# Patient Record
Sex: FEMALE | Race: WHITE
[De-identification: names, ages, dates, MRNs, and addresses within clinical notes are randomized per-mention and may not be internally consistent; named-entity substitution may affect disease eponyms.]

---

## 2020-02-04 ENCOUNTER — HOSPITAL ENCOUNTER (EMERGENCY)
Dept: HOSPITAL 62 - ER | Age: 60
LOS: 1 days | Discharge: HOME | End: 2020-02-05
Payer: COMMERCIAL

## 2020-02-04 VITALS — DIASTOLIC BLOOD PRESSURE: 71 MMHG | SYSTOLIC BLOOD PRESSURE: 150 MMHG

## 2020-02-04 DIAGNOSIS — R11.2: ICD-10-CM

## 2020-02-04 DIAGNOSIS — E11.9: ICD-10-CM

## 2020-02-04 DIAGNOSIS — R07.9: ICD-10-CM

## 2020-02-04 DIAGNOSIS — Z88.2: ICD-10-CM

## 2020-02-04 DIAGNOSIS — R00.2: ICD-10-CM

## 2020-02-04 DIAGNOSIS — K21.0: Primary | ICD-10-CM

## 2020-02-04 DIAGNOSIS — R06.02: ICD-10-CM

## 2020-02-04 DIAGNOSIS — Z88.0: ICD-10-CM

## 2020-02-04 DIAGNOSIS — F41.9: ICD-10-CM

## 2020-02-04 PROCEDURE — 84443 ASSAY THYROID STIM HORMONE: CPT

## 2020-02-04 PROCEDURE — 83735 ASSAY OF MAGNESIUM: CPT

## 2020-02-04 PROCEDURE — 80053 COMPREHEN METABOLIC PANEL: CPT

## 2020-02-04 PROCEDURE — 93005 ELECTROCARDIOGRAM TRACING: CPT

## 2020-02-04 PROCEDURE — 71046 X-RAY EXAM CHEST 2 VIEWS: CPT

## 2020-02-04 PROCEDURE — 83690 ASSAY OF LIPASE: CPT

## 2020-02-04 PROCEDURE — 84484 ASSAY OF TROPONIN QUANT: CPT

## 2020-02-04 PROCEDURE — 99285 EMERGENCY DEPT VISIT HI MDM: CPT

## 2020-02-04 PROCEDURE — 85025 COMPLETE CBC W/AUTO DIFF WBC: CPT

## 2020-02-04 PROCEDURE — 93010 ELECTROCARDIOGRAM REPORT: CPT

## 2020-02-04 PROCEDURE — 36415 COLL VENOUS BLD VENIPUNCTURE: CPT

## 2020-02-04 NOTE — ER DOCUMENT REPORT
ED Medical Screen (RME)





- General


Chief Complaint: General Weakness


Stated Complaint: SHORTNESS OF BREATH


Time Seen by Provider: 02/04/20 23:55


Mode of Arrival: Wheelchair


Information source: Patient


Notes: 





Patient states that around 930 this evening she had a fast heart rate became 

short of breath and felt faint.  Patient does states she is had cold symptoms 

for the past 5 days.  Patient denies any significant cough.  Patient complains 

of a burning sensation in her chest but attributes this to acid reflux.  Patient

does complain of nausea.  Patient does complain of feeling lightheaded still.





I have greeted and performed a rapid initial assessment of this patient.  A 

comprehensive ED assessment and evaluation of the patient, analysis of test 

results and completion of the medical decision making process will be conducted 

by additional ED providers.


TRAVEL OUTSIDE OF THE [place] IN LAST 30 DAYS: No





- Related Data


Allergies/Adverse Reactions: 


                                        





Penicillins Allergy (Verified 02/04/20 23:53)


   


Sulfa (Sulfonamide Antibiotics) Allergy (Verified 02/04/20 23:53)


   











Physical Exam





- Vital signs


Vitals: 





                                        











Temp Pulse Resp BP Pulse Ox


 


 98.2 F   93   18   150/71 H  99 


 


 02/04/20 23:26  02/04/20 23:26  02/04/20 23:26  02/04/20 23:26  02/04/20 23:26














- Respiratory


Respiratory status: No respiratory distress


Chest status: Tender


Breath sounds: Normal





Course





- Vital Signs


Vital signs: 





                                        











Temp Pulse Resp BP Pulse Ox


 


 98.2 F   93   18   150/71 H  99 


 


 02/04/20 23:26  02/04/20 23:26  02/04/20 23:26  02/04/20 23:26  02/04/20 23:26

## 2020-02-05 LAB
ADD MANUAL DIFF: NO
ALBUMIN SERPL-MCNC: 4.3 G/DL (ref 3.5–5)
ALP SERPL-CCNC: 102 U/L (ref 38–126)
ANION GAP SERPL CALC-SCNC: 9 MMOL/L (ref 5–19)
AST SERPL-CCNC: 29 U/L (ref 14–36)
BASOPHILS # BLD AUTO: 0 10^3/UL (ref 0–0.2)
BASOPHILS NFR BLD AUTO: 0.6 % (ref 0–2)
BILIRUB DIRECT SERPL-MCNC: 0 MG/DL (ref 0–0.4)
BILIRUB SERPL-MCNC: 0.2 MG/DL (ref 0.2–1.3)
BUN SERPL-MCNC: 16 MG/DL (ref 7–20)
CALCIUM: 9.3 MG/DL (ref 8.4–10.2)
CHLORIDE SERPL-SCNC: 106 MMOL/L (ref 98–107)
CO2 SERPL-SCNC: 27 MMOL/L (ref 22–30)
EOSINOPHIL # BLD AUTO: 0 10^3/UL (ref 0–0.6)
EOSINOPHIL NFR BLD AUTO: 0.6 % (ref 0–6)
ERYTHROCYTE [DISTWIDTH] IN BLOOD BY AUTOMATED COUNT: 13.8 % (ref 11.5–14)
GLUCOSE SERPL-MCNC: 128 MG/DL (ref 75–110)
HCT VFR BLD CALC: 41.2 % (ref 36–47)
HGB BLD-MCNC: 13.7 G/DL (ref 12–15.5)
LYMPHOCYTES # BLD AUTO: 1.9 10^3/UL (ref 0.5–4.7)
LYMPHOCYTES NFR BLD AUTO: 22.1 % (ref 13–45)
MCH RBC QN AUTO: 29.9 PG (ref 27–33.4)
MCHC RBC AUTO-ENTMCNC: 33.2 G/DL (ref 32–36)
MCV RBC AUTO: 90 FL (ref 80–97)
MONOCYTES # BLD AUTO: 0.5 10^3/UL (ref 0.1–1.4)
MONOCYTES NFR BLD AUTO: 5.7 % (ref 3–13)
NEUTROPHILS # BLD AUTO: 6.1 10^3/UL (ref 1.7–8.2)
NEUTS SEG NFR BLD AUTO: 71 % (ref 42–78)
PLATELET # BLD: 305 10^3/UL (ref 150–450)
POTASSIUM SERPL-SCNC: 4.5 MMOL/L (ref 3.6–5)
PROT SERPL-MCNC: 7.5 G/DL (ref 6.3–8.2)
RBC # BLD AUTO: 4.57 10^6/UL (ref 3.72–5.28)
TOTAL CELLS COUNTED % (AUTO): 100 %
WBC # BLD AUTO: 8.5 10^3/UL (ref 4–10.5)

## 2020-02-05 NOTE — ER DOCUMENT REPORT
ED General





- General


Chief Complaint: Nausea/Vomiting


Stated Complaint: SHORTNESS OF BREATH


Time Seen by Provider: 02/04/20 23:55


Mode of Arrival: Wheelchair


Information source: Patient


TRAVEL OUTSIDE OF THE [place] IN LAST 30 DAYS: No





- HPI


Onset: Other - last night around 11pm


Onset/Duration: Sudden


Quality of pain: Burning, Sharp


Severity: Moderate


Pain Level: 2


Associated symptoms: Shortness of breath, Other - Anxiety, GERD


Exacerbated by: Denies


Relieved by: Denies


Similar symptoms previously: Yes - with Anxiety


Recently seen / treated by doctor: Yes - Patient recently had an upper and lower

endoscopy and was told she has GERD


Notes: 





59 year old female with a history of GERD (she just had an upper and lower 

endoscopy and she is on a PPI), Diabetes, and Anxiety here for chest pain, 

palpitations, and shortness of breath that started around 11PM last night and 

lasted a couple hours. The patient says she has been under a lot of stress and 

she felt like this was just a panic attack. The patient tells me she saw a 

Cardiologist about a year ago since she was having chest pains and she had a [facility] cadia stress test then. The patient was referred to a GI Doctor by the 

Cardiologist and she recently was diagnosed with GERD.





- Related Data


Allergies/Adverse Reactions: 


                                        





Penicillins Allergy (Verified 02/04/20 23:53)


   


Sulfa (Sulfonamide Antibiotics) Allergy (Verified 02/04/20 23:53)


   











Past Medical History





- General


Information source: Patient





- Social History


Smoking Status: Never Smoker


Chew tobacco use (# tins/day): No


Frequency of alcohol use: Social


Drug Abuse: None


Lives with: Family


Family History: CAD - in parents in their mid 50s


Patient has suicidal ideation: No


Patient has homicidal ideation: No





- Past Medical History


Cardiac Medical History: Reports: None


Pulmonary Medical History: Reports: None


EENT Medical History: Reports: None


Neurological Medical History: Reports: None


Endocrine Medical History: Reports: Hx Diabetes Mellitus Type 2


Renal/ Medical History: Reports: None


Malignancy Medical History: Reports: None


GI Medical History: Reports: Hx Gastroesophageal Reflux Disease


Musculoskeletal Medical History: Reports None


Skin Medical History: Reports None


Psychiatric Medical History: Reports: Hx Anxiety





Review of Systems





- Review of Systems


Constitutional: No symptoms reported


EENT: No symptoms reported


Cardiovascular: Chest pain, Palpitations


Respiratory: Short of breath


Gastrointestinal: Other - GERD Symptoms


Genitourinary: No symptoms reported


Female Genitourinary: No symptoms reported


Musculoskeletal: No symptoms reported


Skin: No symptoms reported


Hematologic/Lymphatic: No symptoms reported


Neurological/Psychological: Anxiety


-: Yes All other systems reviewed and negative





Physical Exam





- Vital signs


Vitals: 


                                        











Temp Pulse Resp BP Pulse Ox


 


 98.2 F   93   18   150/71 H  99 


 


 02/04/20 23:26  02/04/20 23:26  02/04/20 23:26  02/04/20 23:26  02/04/20 23:26














- Notes


Notes: 





GENERAL: Well-appearing, well-nourished and in no acute distress.


HEAD: Atraumatic, normocephalic.


EYES: Pupils equal round and reactive to light, extraocular movements intact, 

sclera anicteric, conjunctiva are normal.


ENT: TMs normal, nares patent, oropharynx clear without exudates.  Moist mucous 

membranes.


NECK: Normal range of motion, supple without lymphadenopathy or JVD.


LUNGS: Breath sounds clear to auscultation bilaterally and equal.  No wheezes 

rales or rhonchi.


HEART: Regular rate and rhythm without murmurs, rubs or gallops.


ABDOMEN: Soft, nontender, normoactive bowel sounds.  No guarding, no rebound.  

No masses appreciated.


EXTREMITIES: Normal range of motion, no pitting or edema.  No clubbing or 

cyanosis.


NEUROLOGICAL: Cranial nerves II through XII grossly intact.  Normal speech, nor

mal gait.


PSYCH: Normal mood, normal affect.


SKIN: Warm, Dry, normal turgor, no rashes or lesions noted.





Course





- Re-evaluation


Re-evalutation: 





02/05/20 04:23


The patient sounds like she had a panic attack or a flare up of her GERD. The 

patient is had a normal stress test about a year ago. Although she has some risk

factors for CAD (she has DM and a family history of CAD), the patient's chest 

pain episode does not sound consistent with CAD today. The patient had an 

unremarkable EKG, unremarkable chest xray, and negative Trop. Patient told to 

follow up with her PCP, GI Doctor and to consider follow up with the 

Cardiologist she saw in the last year (especially if she has chest pains not in 

the setting of anxiety and GERD).





- Vital Signs


Vital signs: 


                                        











Temp Pulse Resp BP Pulse Ox


 


 98.2 F   93   18   150/71 H  99 


 


 02/04/20 23:26  02/04/20 23:26  02/04/20 23:26  02/04/20 23:26  02/04/20 23:26














- Laboratory


Result Diagrams: 


                                 02/05/20 00:30





                                 02/05/20 00:30


Laboratory results interpreted by me: 


                                        











  02/05/20





  00:30


 


Glucose  128 H














- Diagnostic Test


Radiology reviewed: Image reviewed, Reports reviewed





- EKG Interpretation by Me


EKG shows normal: Sinus rhythm, Axis, Intervals, QRS Complexes, ST-T Waves


Rate: Normal





Discharge





- Discharge


Clinical Impression: 


 Palpitations, Anxiety





Chest pain


Qualifiers:


 Chest pain type: unspecified Qualified Code(s): R07.9 - Chest pain, unspecified





GERD (gastroesophageal reflux disease)


Qualifiers:


 Esophagitis presence: with esophagitis Qualified Code(s): K21.0 - Gastro-

esophageal reflux disease with esophagitis





Condition: Stable


Disposition: HOME, SELF-CARE


Instructions:  Anxiety ([facility]), Chest Pain of Unclear Cause ([facility]), Reflux Disease 

(GERD) ([facility])


Additional Instructions: 


Follow up with your primary care doctor and with your GI Doctor. Consider follow

up with a Cardiologist if you have chest pains which are not related to anxiety 

or acid reflux. Return to an ER for persistent chest pain, chest pain with 

nausea/vomiting, chest pain with trouble breathing, radiation of chest pain to 

your arms or neck or if you are worse.

## 2020-02-05 NOTE — RADIOLOGY REPORT (SQ)
PA and lateral chest radiograph: 2/4/2020 11:27 PM CST



Comparison: None available



Indication:   59 -year old patient with dyspnea.



Findings: The cardiomediastinal silhouette is normal in size.No

pneumothorax is seen. No acute airspace opacities are seen. No

discrete pleural effusion is apparent.



Impression: No acute airspace opacities are seen.